# Patient Record
Sex: MALE | Race: BLACK OR AFRICAN AMERICAN | NOT HISPANIC OR LATINO | Employment: STUDENT | URBAN - METROPOLITAN AREA
[De-identification: names, ages, dates, MRNs, and addresses within clinical notes are randomized per-mention and may not be internally consistent; named-entity substitution may affect disease eponyms.]

---

## 2017-03-11 ENCOUNTER — HOSPITAL ENCOUNTER (EMERGENCY)
Facility: HOSPITAL | Age: 22
Discharge: PSYCHIATRIC OR SUBSTANCE USE FACILITY | End: 2017-03-11
Attending: EMERGENCY MEDICINE
Payer: COMMERCIAL

## 2017-03-11 VITALS
WEIGHT: 160 LBS | HEART RATE: 74 BPM | TEMPERATURE: 98 F | HEIGHT: 69 IN | RESPIRATION RATE: 18 BRPM | SYSTOLIC BLOOD PRESSURE: 139 MMHG | OXYGEN SATURATION: 100 % | BODY MASS INDEX: 23.7 KG/M2 | DIASTOLIC BLOOD PRESSURE: 89 MMHG

## 2017-03-11 DIAGNOSIS — F29 PSYCHOSIS, UNSPECIFIED PSYCHOSIS TYPE: Primary | ICD-10-CM

## 2017-03-11 LAB
ALBUMIN SERPL BCP-MCNC: 4.1 G/DL
ALP SERPL-CCNC: 54 U/L
ALT SERPL W/O P-5'-P-CCNC: 14 U/L
AMPHET+METHAMPHET UR QL: NEGATIVE
ANION GAP SERPL CALC-SCNC: 10 MMOL/L
APAP SERPL-MCNC: <3 UG/ML
AST SERPL-CCNC: 21 U/L
BARBITURATES UR QL SCN>200 NG/ML: NEGATIVE
BASOPHILS # BLD AUTO: 0.01 K/UL
BASOPHILS NFR BLD: 0.2 %
BENZODIAZ UR QL SCN>200 NG/ML: NEGATIVE
BILIRUB SERPL-MCNC: 0.7 MG/DL
BILIRUB UR QL STRIP: NEGATIVE
BUN SERPL-MCNC: 10 MG/DL
BZE UR QL SCN: NEGATIVE
CALCIUM SERPL-MCNC: 9.7 MG/DL
CANNABINOIDS UR QL SCN: NEGATIVE
CHLORIDE SERPL-SCNC: 104 MMOL/L
CLARITY UR REFRACT.AUTO: CLEAR
CO2 SERPL-SCNC: 26 MMOL/L
COLOR UR AUTO: NORMAL
CREAT SERPL-MCNC: 1 MG/DL
CREAT UR-MCNC: 66 MG/DL
DIFFERENTIAL METHOD: ABNORMAL
EOSINOPHIL # BLD AUTO: 0.1 K/UL
EOSINOPHIL NFR BLD: 1.9 %
ERYTHROCYTE [DISTWIDTH] IN BLOOD BY AUTOMATED COUNT: 12.4 %
EST. GFR  (AFRICAN AMERICAN): >60 ML/MIN/1.73 M^2
EST. GFR  (NON AFRICAN AMERICAN): >60 ML/MIN/1.73 M^2
ETHANOL SERPL-MCNC: <10 MG/DL
GLUCOSE SERPL-MCNC: 98 MG/DL
GLUCOSE UR QL STRIP: NEGATIVE
HCT VFR BLD AUTO: 45.1 %
HGB BLD-MCNC: 15.7 G/DL
HGB UR QL STRIP: NEGATIVE
KETONES UR QL STRIP: NEGATIVE
LEUKOCYTE ESTERASE UR QL STRIP: NEGATIVE
LYMPHOCYTES # BLD AUTO: 2.2 K/UL
LYMPHOCYTES NFR BLD: 45.6 %
MCH RBC QN AUTO: 31.3 PG
MCHC RBC AUTO-ENTMCNC: 34.8 %
MCV RBC AUTO: 90 FL
METHADONE UR QL SCN>300 NG/ML: NEGATIVE
MONOCYTES # BLD AUTO: 0.3 K/UL
MONOCYTES NFR BLD: 5.7 %
NEUTROPHILS # BLD AUTO: 2.2 K/UL
NEUTROPHILS NFR BLD: 46.6 %
NITRITE UR QL STRIP: NEGATIVE
OPIATES UR QL SCN: NEGATIVE
PCP UR QL SCN>25 NG/ML: NEGATIVE
PH UR STRIP: 8 [PH] (ref 5–8)
PLATELET # BLD AUTO: 197 K/UL
PMV BLD AUTO: 11.5 FL
POTASSIUM SERPL-SCNC: 4.5 MMOL/L
PROT SERPL-MCNC: 7.2 G/DL
PROT UR QL STRIP: NEGATIVE
RBC # BLD AUTO: 5.01 M/UL
SODIUM SERPL-SCNC: 140 MMOL/L
SP GR UR STRIP: 1.01 (ref 1–1.03)
TOXICOLOGY INFORMATION: NORMAL
TSH SERPL DL<=0.005 MIU/L-ACNC: 0.44 UIU/ML
URN SPEC COLLECT METH UR: NORMAL
UROBILINOGEN UR STRIP-ACNC: NEGATIVE EU/DL
WBC # BLD AUTO: 4.74 K/UL

## 2017-03-11 PROCEDURE — 99285 EMERGENCY DEPT VISIT HI MDM: CPT

## 2017-03-11 PROCEDURE — 84443 ASSAY THYROID STIM HORMONE: CPT

## 2017-03-11 PROCEDURE — 80053 COMPREHEN METABOLIC PANEL: CPT

## 2017-03-11 PROCEDURE — 81003 URINALYSIS AUTO W/O SCOPE: CPT

## 2017-03-11 PROCEDURE — 99285 EMERGENCY DEPT VISIT HI MDM: CPT | Mod: ,,, | Performed by: EMERGENCY MEDICINE

## 2017-03-11 PROCEDURE — 82570 ASSAY OF URINE CREATININE: CPT

## 2017-03-11 PROCEDURE — 25000003 PHARM REV CODE 250: Performed by: EMERGENCY MEDICINE

## 2017-03-11 PROCEDURE — 85025 COMPLETE CBC W/AUTO DIFF WBC: CPT

## 2017-03-11 PROCEDURE — 80320 DRUG SCREEN QUANTALCOHOLS: CPT

## 2017-03-11 PROCEDURE — 80329 ANALGESICS NON-OPIOID 1 OR 2: CPT

## 2017-03-11 RX ORDER — RISPERIDONE 1 MG/1
1 TABLET ORAL NIGHTLY
Status: DISCONTINUED | OUTPATIENT
Start: 2017-03-11 | End: 2017-03-11

## 2017-03-11 RX ORDER — RISPERIDONE 1 MG/1
1 TABLET ORAL
Status: COMPLETED | OUTPATIENT
Start: 2017-03-11 | End: 2017-03-11

## 2017-03-11 RX ADMIN — RISPERIDONE 1 MG: 1 TABLET ORAL at 06:03

## 2017-03-11 NOTE — ED TRIAGE NOTES
"Pt comes to ED with family present. States he has been hearing voices for a few weeks. States voices will say things like "There you go" or "Here he is". No suicidal or homicidal thoughts. Voices do not tell patient to do anything.  "

## 2017-03-11 NOTE — ED PROVIDER NOTES
Encounter Date: 3/11/2017    SCRIBE #1 NOTE: I, Arden Light, am scribing for, and in the presence of,  Dr. Waddell. I have scribed the following portions of the note - Other sections scribed: Attending ED Notes.       History     Chief Complaint   Patient presents with    Hallucinations     patient denies any SI or HI      Review of patient's allergies indicates:  No Known Allergies  HPI Comments: 23 y/o M presents with AH.  Onset several weeks ago, gradually worsening.  Voices are making random statements, but cannot discharge him to harm himself or others.  No headache, no fever.  No suicidal ideation, no visual hallucination.  No recent drug or etoh use.  Symptoms are acute and gradually worsening of the past several weeks.  No other physical complaints.  No sleep disturbance.  No withdrawal from social life.  No changes in appetite.    The history is provided by the patient.     History reviewed. No pertinent past medical history.  History reviewed. No pertinent surgical history.  Family History   Problem Relation Age of Onset    Family history unknown: Yes     Social History   Substance Use Topics    Smoking status: Former Smoker     Types: Cigarettes    Smokeless tobacco: None    Alcohol use Yes      Comment: very rarely      Review of Systems   Constitutional: Negative for fever.   HENT: Negative for sore throat.    Respiratory: Negative for shortness of breath.    Cardiovascular: Negative for chest pain.   Gastrointestinal: Negative for nausea.   Genitourinary: Negative for dysuria.   Musculoskeletal: Negative for back pain.   Skin: Negative for rash.   Neurological: Negative for weakness.   Hematological: Does not bruise/bleed easily.   Psychiatric/Behavioral: Positive for hallucinations. Negative for confusion and sleep disturbance. The patient is not nervous/anxious.        Physical Exam   Initial Vitals   BP Pulse Resp Temp SpO2   03/11/17 1155 03/11/17 1155 03/11/17 1155 03/11/17 1155 03/11/17  1155   155/96 65 18 97.4 °F (36.3 °C) 99 %     Physical Exam    Nursing note and vitals reviewed.  Constitutional: He appears well-developed and well-nourished. He is not diaphoretic. No distress.   HENT:   Head: Normocephalic and atraumatic.   Eyes: EOM are normal.   Neck: Normal range of motion. Neck supple. No rigidity.   Cardiovascular: Normal rate, regular rhythm, normal heart sounds and intact distal pulses.   Pulmonary/Chest: Breath sounds normal. No stridor. No respiratory distress. He has no wheezes. He has no rhonchi. He has no rales.   Abdominal: Soft. Bowel sounds are normal. He exhibits no distension. There is no tenderness. There is no rebound and no guarding.   Musculoskeletal: Normal range of motion. He exhibits no edema or tenderness.   Neurological: He is alert and oriented to person, place, and time.   Skin: Skin is warm and dry.   Psychiatric: He has a normal mood and affect. His behavior is normal. Judgment and thought content normal.         ED Course   Procedures  Labs Reviewed   CBC W/ AUTO DIFFERENTIAL - Abnormal; Notable for the following:        Result Value    MCH 31.3 (*)     All other components within normal limits   COMPREHENSIVE METABOLIC PANEL - Abnormal; Notable for the following:     Alkaline Phosphatase 54 (*)     All other components within normal limits   ACETAMINOPHEN LEVEL - Abnormal; Notable for the following:     Acetaminophen (Tylenol), Serum <3.0 (*)     All other components within normal limits   TSH   URINALYSIS   DRUG SCREEN PANEL, URINE EMERGENCY   ALCOHOL,MEDICAL (ETHANOL)             Medical Decision Making:   History:   Old Medical Records: I decided to obtain old medical records.  Initial Assessment:   23 y/o male presents with auditory hallucinations.  Differential includes limited to: Drug-induced mood disorder, acute psychosis, schizophrenia, bipolar, metabolic or electrolyte disturbance, thyroid dysfunction.  Will obtain screening labs and consult  psychiatry.  Clinical Tests:   Lab Tests: Ordered and Reviewed  Other:   I have discussed this case with another health care provider.            Scribe Attestation:   Scribe #1: I performed the above scribed service and the documentation accurately describes the services I performed. I attest to the accuracy of the note.    Attending Attestation:           Physician Attestation for Scribe:  Physician Attestation Statement for Scribe #1: I, Dr. Waddell, reviewed documentation, as scribed by Arden Light  in my presence, and it is both accurate and complete.         Attending ED Notes:   Pt is medically cleared.  Psych consulted and agrees that this likely represents acute psychosis from undiagnosed schizophrenia.  PEC.  Transfer to other psych facility for grave disability and inpt admission.          ED Course     Clinical Impression:   The encounter diagnosis was Psychosis, unspecified psychosis type.    Disposition:   Disposition: Transferred       Allen Waddell MD  03/11/17 [id number]

## 2017-03-11 NOTE — ED NOTES
Admit packet faxed to [facility], [facility], [facility], [facility], [facility], [facility], [facility], [facility], [facility] and [facility]. Waiting for response.

## 2017-03-11 NOTE — CONSULTS
"3/11/2017 2:58 PM   Juarez Julio   1995   [id number]        Psychiatric Consult     Chief complaint/Reason for consult: Auditory Hallucinations    SUBJECTIVE:   HPI:   Juarez Julio is a 22 y.o. male with past psychiatric history of substance induced psychosis, currently in the ED with a chief complain of auditory hallucinations.  Psychiatry has been consulted to address this problem.      Patient was admitted to [facility] 5/29/16 with discharge diagnoses of Synthetic substance use disorder  Cannabis use disorder, severe, dependence  Substance induced psychosis    Per ED MD Waddell 3/11/17:  23 y/o M presents with AH. Onset several weeks ago, gradually worsening. Voices are making random statements, but cannot discharge him to harm himself or others. No headache, no fever. No suicidal ideation, no visual hallucination. No recent drug or etoh use. Symptoms are acute and gradually worsening of the past several weeks. No other physical complaints. No sleep disturbance. No withdrawal from social life. No changes in appetite    On my interview: Mr. Julio is cheerful. He alternatingly describes his hallucinations as a problem and not a problem. He states that when he hears them, he tries to "laugh them off". He states that he is not really bothered by them, but then states that he feels that the voices could be from people in his dorm room are the ones that are talking to him. He states that they often comment on the things that he is doing,and if he moves in the middle of hte night, he will often hear a voice saying "look he's up". He describes the voices as sometimes female and sometimes male. He reports that his last substance use was February 16th, and he has been abstaining since that time, but the voices have gotten worse. He reports that he is in his last semester of college, studying kinesiology, and his grades have been about the same as before. He then divulges that he believes that the people he " thinks are talking to him have set up cameras and microphones in his room. When confronted about the fact that he has heard these voices while in the hospital, he is unable to explain how they would be talking to him or knowing what he's doing. He denies VH/SI/HI. Denies ideas of reference. No ideas of reference (tv/billboards/etc)    Pain: 0/10    Collateral:   MotherLeonidas is in the room at time of interview and confirms much of the HPI.    Medical Review Of Systems:  Constitutional: negative for chills, fatigue and fevers  Ears, nose, mouth, throat, and face: negative for hoarseness, sore mouth and sore throat  Respiratory: negative for cough, dyspnea on exertion, stridor and wheezing  Cardiovascular: negative for fatigue, palpitations and syncope  Gastrointestinal: negative for constipation, diarrhea, nausea and vomiting  Musculoskeletal:negative for back pain and muscle weakness  Neurological: negative for dizziness, gait problems and headaches    Current Medications:   Scheduled Meds:    PRN Meds:    Psychotherapeutics     None        OTC Meds: None   Home Meds: None    Allergies:   Review of patient's allergies indicates:  No Known Allergies     Past Medical/Surgical History:   History reviewed. No pertinent past medical history.  History reviewed. No pertinent surgical history.     Past Psychiatric History:   Previous Psychiatric Hospitalizations: [facility] 2016 for substance induced psychosis  Previous Medication Trials: Haldol andAtivan given last hospitalization  Previous Suicide Attempts: none   History of Violence: denies  Outpatient psychiatrist: none     Nerurological History:   Seizures: denies  Head trauma: denies     Social History:   Education: currently in final semester at [employer] Ed: no   Employment Status/Info: in school   Financial: reliesonfamily  Marital Status: single  Children: 0  Housing Status: Lives at a dorm   history: none  History of phys/sexual  "abuse: denies   Access to gun: denies    Substance Abuse History:   Recreational Drugs: previously marijuana and synthetic cannabinoids, most recent marijuana use2/16/17, most recent synthetic use 2016   Use of Alcohol: occasional  Tobacco Use: "every once in a while"  Rehab History: denies    Legal History:   Past Charges/Incarcerations: denies  Pending charges: denies    Family Psychiatric History:   denies    OBJECTIVE:   Vitals   Vitals:    03/11/17 1155   BP: (!) 155/96   Pulse: 65   Resp: 18   Temp: 97.4 °F (36.3 °C)        Labs/Imaging/Studies:   Recent Results (from the past 48 hour(s))   CBC auto differential    Collection Time: 03/11/17 12:33 PM   Result Value Ref Range    WBC 4.74 3.90 - 12.70 K/uL    RBC 5.01 4.60 - 6.20 M/uL    Hemoglobin 15.7 14.0 - 18.0 g/dL    Hematocrit 45.1 40.0 - 54.0 %    MCV 90 82 - 98 fL    MCH 31.3 (H) 27.0 - 31.0 pg    MCHC 34.8 32.0 - 36.0 %    RDW 12.4 11.5 - 14.5 %    Platelets 197 150 - 350 K/uL    MPV 11.5 9.2 - 12.9 fL    Gran # 2.2 1.8 - 7.7 K/uL    Lymph # 2.2 1.0 - 4.8 K/uL    Mono # 0.3 0.3 - 1.0 K/uL    Eos # 0.1 0.0 - 0.5 K/uL    Baso # 0.01 0.00 - 0.20 K/uL    Gran% 46.6 38.0 - 73.0 %    Lymph% 45.6 18.0 - 48.0 %    Mono% 5.7 4.0 - 15.0 %    Eosinophil% 1.9 0.0 - 8.0 %    Basophil% 0.2 0.0 - 1.9 %    Differential Method Automated    Comprehensive metabolic panel    Collection Time: 03/11/17 12:33 PM   Result Value Ref Range    Sodium 140 136 - 145 mmol/L    Potassium 4.5 3.5 - 5.1 mmol/L    Chloride 104 95 - 110 mmol/L    CO2 26 23 - 29 mmol/L    Glucose 98 70 - 110 mg/dL    BUN, Bld 10 6 - 20 mg/dL    Creatinine 1.0 0.5 - 1.4 mg/dL    Calcium 9.7 8.7 - 10.5 mg/dL    Total Protein 7.2 6.0 - 8.4 g/dL    Albumin 4.1 3.5 - 5.2 g/dL    Total Bilirubin 0.7 0.1 - 1.0 mg/dL    Alkaline Phosphatase 54 (L) 55 - 135 U/L    AST 21 10 - 40 U/L    ALT 14 10 - 44 U/L    Anion Gap 10 8 - 16 mmol/L    eGFR if African American >60.0 >60 mL/min/1.73 m^2    eGFR if non African " American >60.0 >60 mL/min/1.73 m^2   TSH    Collection Time: 03/11/17 12:33 PM   Result Value Ref Range    TSH 0.442 0.400 - 4.000 uIU/mL   Ethanol    Collection Time: 03/11/17 12:33 PM   Result Value Ref Range    Alcohol, Medical, Serum <10 <10 mg/dL   Acetaminophen level    Collection Time: 03/11/17 12:33 PM   Result Value Ref Range    Acetaminophen (Tylenol), Serum <3.0 (L) 10.0 - 20.0 ug/mL   Urinalysis - clean catch    Collection Time: 03/11/17  1:07 PM   Result Value Ref Range    Specimen UA Urine, Clean Catch     Color, UA Straw Yellow, Straw, Linsey    Appearance, UA Clear Clear    pH, UA 8.0 5.0 - 8.0    Specific Gravity, UA 1.010 1.005 - 1.030    Protein, UA Negative Negative    Glucose, UA Negative Negative    Ketones, UA Negative Negative    Bilirubin (UA) Negative Negative    Occult Blood UA Negative Negative    Nitrite, UA Negative Negative    Urobilinogen, UA Negative <2.0 EU/dL    Leukocytes, UA Negative Negative   Drug screen panel, emergency    Collection Time: 03/11/17  1:07 PM   Result Value Ref Range    Benzodiazepines Negative     Methadone metabolites Negative     Cocaine (Metab.) Negative     Opiate Scrn, Ur Negative     Barbiturate Screen, Ur Negative     Amphetamine Screen, Ur Negative     THC Negative     Phencyclidine Negative     Creatinine, Random Ur 66.0 23.0 - 375.0 mg/dL    Toxicology Information SEE COMMENT       No results found for: PHENYTOIN, PHENOBARB, VALPROATE, CBMZ      Physical Exam:   General: well developed, well nourished  Head: normocephalic, atraumatic  Throat: lips, mucosa, and tongue normal; teeth and gums normal and no throat erythema  Neck: supple, symmetrical, trachea midline, no JVD and thyroid not enlarged, symmetric, no tenderness/mass/nodules  Lungs:  clear to auscultation bilaterally and normal respiratory effort  Heart: regular rate and rhythm, S1, S2 normal, no murmur, click, rub or gallop  Abdomen: soft, non-tender non-distented; bowel sounds normal; no  masses,  no organomegaly  Pulses: 2+ and symmetric  Skin: Skin color, texture, turgor normal. No rashes or lesions  Neurologic: Normal strength and tone. No focal numbness or weakness    Mental Status Exam:  Appearance: unremarkable, age appropriate  Grooming: appropriate to situation  Arousal: alert, awake  Behavior/Cooperation: normal, cooperative  Speech: normal tone, normal rate, normal pitch, normal volume  Language: appropriate english vocabulary  Mood: fine  Affect: euthymic  Thought Process: normal and logical  Thought Content: normal, no suicidality, no homicidality, delusions, or paranoia  Associations: no loose associations noted  Orientation: grossly intact  Memory: Remote intact and Recent intact  Fund of Knowledge: appropriate for education level  Attention Span/Concentration: Normal  Cognition: grossly intact  Insight: poor with regards to his current problem. Appears to have fair insight otherwise  Judgment: poor, patient wishes to return home and refuse inpatient treatment      ASSESSMENT/PLAN:   Unspecified schizophrenia spectrum disorder (first break) vs Substance induced psychotic disorder    Recommendations:   1. Dispo/Legal Status: Cont PEC at this time as the pt is currently gravely disabled as evidenced by paranoia toward dorm-mates. Seek inpt bed for pt safety and stabilization when/if medically cleared by the ER MD. Continue to observe pt's behavior while in the ER and will reassess the pt daily until placement is found.    2. Scheduled Medications: Risperdal 1mg QHS Defer any non-psych meds to the ER MD.    3. PRN Medications: Haldol/Ativan prn non-redirectable agitation associated with breakthrough psychosis or analia if needed to help the pt more effectively interact with his environment.     4. Precautions/Nursing: elopement      5. To-Do: seek Placement      6. Case Discussed with: Dr. Pepper Ladd M.D.  3/11/2017 3:58 PM  [facility]  [place]  [phone]

## 2024-05-20 ENCOUNTER — OFFICE VISIT (OUTPATIENT)
Dept: DERMATOLOGY | Facility: CLINIC | Age: 29
End: 2024-05-20
Payer: COMMERCIAL

## 2024-05-20 DIAGNOSIS — D48.5 NEOPLASM OF UNCERTAIN BEHAVIOR OF SKIN: Primary | ICD-10-CM

## 2024-05-20 PROCEDURE — 99499 UNLISTED E&M SERVICE: CPT | Mod: S$GLB,,, | Performed by: DERMATOLOGY

## 2024-05-20 PROCEDURE — 99999 PR PBB SHADOW E&M-NEW PATIENT-LVL III: CPT | Mod: PBBFAC,,, | Performed by: DERMATOLOGY

## 2024-05-20 PROCEDURE — 11103 TANGNTL BX SKIN EA SEP/ADDL: CPT | Mod: S$GLB,,, | Performed by: DERMATOLOGY

## 2024-05-20 PROCEDURE — 88305 TISSUE EXAM BY PATHOLOGIST: CPT | Mod: 26,,, | Performed by: PATHOLOGY

## 2024-05-20 PROCEDURE — 88305 TISSUE EXAM BY PATHOLOGIST: CPT | Mod: 59 | Performed by: PATHOLOGY

## 2024-05-20 PROCEDURE — 11102 TANGNTL BX SKIN SINGLE LES: CPT | Mod: S$GLB,,, | Performed by: DERMATOLOGY

## 2024-05-20 NOTE — PATIENT INSTRUCTIONS
Shave Biopsy Wound Care    Your doctor has performed a shave biopsy today.  A band aid and vaseline ointment has been placed over the site.  This should remain in place for 24 hours.  It is recommended that you keep the area dry for the first 24 hours.  After 24 hours, you may remove the band aid and wash the area with warm soap and water and apply Vaseline jelly.  Many patients prefer to use Neosporin or Bacitracin ointment.  This is acceptable; however, know that you can develop an allergy to this medication even if you have used it safely for years.  It is important to keep the area moist.  Letting it dry out and get air slows healing time, and will worsen the scar.  Band aid is optional after first 24 hours.      If you notice increasing redness, tenderness, pain, or yellow drainage at the biopsy site, please notify your doctor.  These are signs of an infection.    If your biopsy site is bleeding, apply firm pressure for 15 minutes straight.  Repeat for another 15 minutes, if it is still bleeding.   If the surgical site continues to bleed, then please contact your doctor.      [facility] - [facility]   DERMATOLOGY  [address]   [place] [address]   Dept: [phone]   Dept Fax: [phone]

## 2024-05-20 NOTE — PROGRESS NOTES
Subjective:      Patient ID:  Juarez Julio is a 29 y.o. male who presents for No chief complaint on file.    History of Present Illness: The patient presents with chief complaint of keloid located on the back. Concerned about keloid  Location: back   Duration: several years   Signs/Symptoms: none    Prior treatments: none       Review of Systems   Constitutional:  Negative for fever and chills.   Gastrointestinal:  Negative for nausea and vomiting.   Skin:  Positive for activity-related sunscreen use. Negative for daily sunscreen use and recent sunburn.   Hematologic/Lymphatic: Does not bruise/bleed easily.       Objective:   Physical Exam   Constitutional: He appears well-developed and well-nourished. No distress.   Neurological: He is alert and oriented to person, place, and time. He is not disoriented.   Psychiatric: He has a normal mood and affect.   Skin:   Areas Examined (abnormalities noted in diagram):   Head / Face Inspection Performed  Neck Inspection Performed  Chest / Axilla Inspection Performed  Abdomen Inspection Performed  Back Inspection Performed  RUE Inspected  LUE Inspection Performed  Nails and Digits Inspection Performed                        Assessment / Plan:      Pathology Orders:       Normal Orders This Visit    Specimen to Pathology, Dermatology     Comments:    Number of Specimens:->2  ------------------------->-------------------------  Spec 1 Procedure:->Biopsy  Spec 1 Clinical Impression:->neurofibroma  Spec 1 Source:->right back  ------------------------->-------------------------  Spec 2 Procedure:->Biopsy  Spec 2 Clinical Impression:->neurofibroma  Spec 2 Source:->right chest  Release to patient->Immediate    Questions:    Procedure Type: Dermatology and skin neoplasms    Number of Specimens: 2    ------------------------: -------------------------    Spec 1 Procedure: Biopsy    Spec 1 Clinical Impression: neurofibroma    Spec 1 Source: right back    ------------------------:  -------------------------    Spec 2 Procedure: Biopsy    Spec 2 Clinical Impression: neurofibroma    Spec 2 Source: right chest    Clinical Information: see above    Release to patient: Immediate          Neoplasm of uncertain behavior of skin  -     Specimen to Pathology, Dermatology  -      if multiple neurofibromas, will refer to genetics. Shave biopsy(-ies) done of 2 site(s).   Patient informed to call for results within 2 weeks if have not received notification [facility] telephone [facility] or [facility]             Follow up for call for results.    PROCEDURE NOTE - SHAVE BIOPSY   Location: see above    After risk, benefits, and alternatives were discussed with the patient, the patient agrees to the procedure by verbal informed consent.  The area(s) were cleansed with alcohol. 2 cc of lidocaine 1% with epinephrine was injected for local anesthesia into each lesion(s).  A sharp dermablade was used to remove part or all of the lesion(s).  The specimen(s) will be sent for tissue pathology.  Hemostasis was obtained with aluminum chloride and/or hyfrecation.  The area(s) were dressed with vaseline ointment and bandaged.  The patient tolerated the procedure well without adverse events.  Wound care instructions were given to the patient on the AVS.  The patient will be notified of pathology results once available. Results will also be available in Epic.

## 2024-05-23 DIAGNOSIS — Q85.00 NEUROFIBROMA OF MULTIPLE SITES: Primary | ICD-10-CM

## 2024-05-23 LAB
FINAL PATHOLOGIC DIAGNOSIS: NORMAL
GROSS: NORMAL
Lab: NORMAL
MICROSCOPIC EXAM: NORMAL

## 2024-05-29 ENCOUNTER — TELEPHONE (OUTPATIENT)
Dept: DERMATOLOGY | Facility: CLINIC | Age: 29
End: 2024-05-29
Payer: COMMERCIAL

## 2024-05-29 NOTE — TELEPHONE ENCOUNTER
Called pt regarding results. Pt was notified of path finding and referral. Pt stated no questions and verbalized understanding.     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  The next step is to refer him to Genetics so that he can be worked up for possible neurofibromatosis which is a condition that can affect the skin, the muscles, the eyes, and also can cause neurological effects.  This does not mean that he has neurofibromatosis, but he will need genetic/blood testing to diagnose or rule out.  I will place a referral for genetics.  If he does not hear from them within the next week, he should reach out to us and let us know.

## 2024-09-30 ENCOUNTER — OFFICE VISIT (OUTPATIENT)
Dept: DERMATOLOGY | Facility: CLINIC | Age: 29
End: 2024-09-30
Payer: COMMERCIAL

## 2024-09-30 DIAGNOSIS — D48.5 NEOPLASM OF UNCERTAIN BEHAVIOR OF SKIN: ICD-10-CM

## 2024-09-30 DIAGNOSIS — Q85.00 NEUROFIBROMA OF MULTIPLE SITES: Primary | ICD-10-CM

## 2024-09-30 PROCEDURE — 99999 PR PBB SHADOW E&M-EST. PATIENT-LVL II: CPT | Mod: PBBFAC,,, | Performed by: DERMATOLOGY

## 2024-09-30 PROCEDURE — 99499 UNLISTED E&M SERVICE: CPT | Mod: S$GLB,,, | Performed by: DERMATOLOGY

## 2024-09-30 PROCEDURE — 88305 TISSUE EXAM BY PATHOLOGIST: CPT | Performed by: DERMATOLOGY

## 2024-09-30 PROCEDURE — 11102 TANGNTL BX SKIN SINGLE LES: CPT | Mod: S$GLB,,, | Performed by: DERMATOLOGY

## 2024-09-30 PROCEDURE — 88305 TISSUE EXAM BY PATHOLOGIST: CPT | Mod: 26,,, | Performed by: DERMATOLOGY

## 2024-09-30 NOTE — LETTER
September 30, 2024      [facility] Dermatology  [address] [place] [address]  Phone: [phone]  Fax: [phone]       Patient: Juarez Julio   YOB: 1995  Date of Visit: 09/30/2024    To Whom It May Concern:    Nhung Julio  was at [facility] on 09/30/2024. The patient may return to work on 10/01/2024 with no restrictions. If you have any questions or concerns, or if I can be of further assistance, please do not hesitate to contact me.    Sincerely,    Koki Craig RN

## 2024-09-30 NOTE — PATIENT INSTRUCTIONS
Shave Biopsy Wound Care    Your doctor has performed a shave biopsy today.  A band aid and vaseline ointment has been placed over the site.  This should remain in place for 24 hours.  It is recommended that you keep the area dry for the first 24 hours.  After 24 hours, you may remove the band aid and wash the area with warm soap and water and apply Vaseline jelly.  Many patients prefer to use Neosporin or Bacitracin ointment.  This is acceptable; however, know that you can develop an allergy to this medication even if you have used it safely for years.  It is important to keep the area moist.  Letting it dry out and get air slows healing time, and will worsen the scar.  Band aid is optional after first 24 hours.      If you notice increasing redness, tenderness, pain, or yellow drainage at the biopsy site, please notify your doctor.  These are signs of an infection.    If your biopsy site is bleeding, apply firm pressure for 15 minutes straight.  Repeat for another 15 minutes, if it is still bleeding.   If the surgical site continues to bleed, then please contact your doctor.      [facility] - [facility]   DERMATOLOGY  [address]   [place] [address]   Dept: [phone]   Dept Fax: [phone]

## 2024-09-30 NOTE — PROGRESS NOTES
Subjective:      Patient ID:  Juarez Julio is a 29 y.o. male who presents for   Chief Complaint   Patient presents with    Follow-up     Reports the areas are improving.      + growth of lesion of the left upper back, midline lower back (x 2) and abdomen, + discomfort. Here today for shave biopsy.      Review of Systems   Constitutional:  Negative for fever and chills.   Gastrointestinal:  Negative for nausea and vomiting.   Skin:  Positive for activity-related sunscreen use. Negative for daily sunscreen use and recent sunburn.   Hematologic/Lymphatic: Does not bruise/bleed easily.       Objective:   Physical Exam   Constitutional: He appears well-developed and well-nourished. No distress.   Neurological: He is alert and oriented to person, place, and time. He is not disoriented.   Psychiatric: He has a normal mood and affect.   Skin:   Areas Examined (abnormalities noted in diagram):   Head / Face Inspection Performed  Neck Inspection Performed  Chest / Axilla Inspection Performed  Back Inspection Performed  RUE Inspected  LUE Inspection Performed                Assessment / Plan:      Pathology Orders:       Normal Orders This Visit    Specimen to Pathology, Dermatology     Comments:    Number of Specimens:->1  ------------------------->-------------------------  Spec 1 Procedure:->Biopsy  Spec 1 Clinical Impression:->NUB  Spec 1 Source:->left upper back  Release to patient->Immediate    Questions:    Procedure Type: Dermatology and skin neoplasms    Number of Specimens: 1    ------------------------: -------------------------    Spec 1 Procedure: Biopsy    Spec 1 Clinical Impression: NUB    Spec 1 Source: left upper back    Clinical Information: see above    Release to patient: Immediate          Neurofibroma  Shave removal x 3 of the lower back and right chest.    Neoplasm of uncertain behavior of skin  -     Specimen to Pathology, Dermatology  -     Shave biopsy(-ies) done of 1 site(s) left upper back  (largest lesion).   Patient informed to call for results within 2 weeks if have not received notification [facility]           No follow-ups on file.    PROCEDURE NOTE - SHAVE BIOPSY   Location: see above    After risk, benefits, and alternatives were discussed with the patient, the patient agrees to the procedure by verbal informed consent.  The area(s) were cleansed with alcohol. 2 cc of lidocaine 1% with epinephrine was injected for local anesthesia into each lesion(s).  A sharp dermablade was used to remove part or all of the lesion(s).  The specimen(s) will be sent for tissue pathology.  Hemostasis was obtained with aluminum chloride and/or hyfrecation.  The area(s) were dressed with vaseline ointment and bandaged.  The patient tolerated the procedure well without adverse events.  Wound care instructions were given to the patient on the AVS.  The patient will be notified of pathology results once available. Results will also be available in Epic.

## 2024-10-03 LAB
FINAL PATHOLOGIC DIAGNOSIS: NORMAL
GROSS: NORMAL
Lab: NORMAL
MICROSCOPIC EXAM: NORMAL

## 2025-01-04 ENCOUNTER — HOSPITAL ENCOUNTER (EMERGENCY)
Facility: HOSPITAL | Age: 30
Discharge: HOME | End: 2025-01-04
Attending: INTERNAL MEDICINE
Payer: COMMERCIAL

## 2025-01-04 VITALS
TEMPERATURE: 98 F | OXYGEN SATURATION: 97 % | HEART RATE: 64 BPM | WEIGHT: 170 LBS | SYSTOLIC BLOOD PRESSURE: 142 MMHG | HEIGHT: 69 IN | DIASTOLIC BLOOD PRESSURE: 85 MMHG | RESPIRATION RATE: 16 BRPM | BODY MASS INDEX: 25.18 KG/M2

## 2025-01-04 DIAGNOSIS — S05.01XA ABRASION OF RIGHT CORNEA, INITIAL ENCOUNTER: Primary | ICD-10-CM

## 2025-01-04 PROCEDURE — 25000003 PHARM REV CODE 250: Mod: ER | Performed by: NURSE PRACTITIONER

## 2025-01-04 PROCEDURE — 99283 EMERGENCY DEPT VISIT LOW MDM: CPT | Mod: ER

## 2025-01-04 RX ORDER — PROPARACAINE HYDROCHLORIDE 5 MG/ML
1 SOLUTION/ DROPS OPHTHALMIC
Status: COMPLETED | OUTPATIENT
Start: 2025-01-04 | End: 2025-01-04

## 2025-01-04 RX ORDER — ERYTHROMYCIN 5 MG/G
OINTMENT OPHTHALMIC EVERY 8 HOURS
Qty: 1 G | Refills: 0 | Status: SHIPPED | OUTPATIENT
Start: 2025-01-04 | End: 2025-01-09

## 2025-01-04 RX ADMIN — FLUORESCEIN SODIUM 1 EACH: 1 STRIP OPHTHALMIC at 09:01

## 2025-01-04 RX ADMIN — PROPARACAINE HYDROCHLORIDE 1 DROP: 5 SOLUTION/ DROPS OPHTHALMIC at 09:01

## 2025-01-05 NOTE — ED PROVIDER NOTES
Encounter Date: 1/4/2025       History     Chief Complaint   Patient presents with    Eye Injury     PT SCRATCH HIS RIGHT EYE X WEEK AGO, GETTING WORSEN, REDNESS, DISCOMFORT AT THE MOMENT.     29-year-old male presents to the emergency department complaining of scratching his right eye approximately 1 week ago, and worsening right eye pain, with increased tearing and without vision changes.  He denies any other trauma.    The history is provided by the patient. No  was used.     Review of patient's allergies indicates:  No Known Allergies  History reviewed. No pertinent past medical history.  History reviewed. No pertinent surgical history.  Family History   Family history unknown: Yes     Social History     Tobacco Use    Smoking status: Some Days     Types: Cigarettes, Cigars   Substance Use Topics    Alcohol use: Yes     Comment: very rarely     Drug use: Yes     Types: Marijuana     Comment: synthetic mariuana     Review of Systems   Constitutional:  Negative for chills and fever.   Eyes:  Positive for photophobia, pain and redness. Negative for discharge and visual disturbance.   Respiratory:  Negative for shortness of breath.    Cardiovascular:  Negative for chest pain.   Gastrointestinal:  Negative for abdominal pain.   All other systems reviewed and are negative.      Physical Exam     Initial Vitals [01/04/25 2022]   BP Pulse Resp Temp SpO2   (!) 142/85 64 16 97.8 °F (36.6 °C) 97 %      MAP       --         Physical Exam    Nursing note and vitals reviewed.  Constitutional: He is not diaphoretic. No distress.   HENT:   Head: Normocephalic and atraumatic.   Right Ear: External ear normal.   Left Ear: External ear normal. Mouth/Throat: Oropharynx is clear and moist.   Eyes:   Right conjunctival injection with uptake of fluorescein dye to the medial conjunctival region at 3:00   Neck: Neck supple.   Normal range of motion.  Cardiovascular:  Normal rate and regular rhythm.            Pulmonary/Chest: Breath sounds normal.   Abdominal: Abdomen is soft. Bowel sounds are normal.   Musculoskeletal:         General: Normal range of motion.      Cervical back: Normal range of motion and neck supple.     Neurological: He is alert. He has normal strength.   Skin: Skin is warm and dry. Capillary refill takes less than 2 seconds.   Psychiatric: He has a normal mood and affect.         ED Course   Procedures  Labs Reviewed - No data to display       Imaging Results    None          Medications   fluorescein ophthalmic strip 1 each (1 each Both Eyes Given 1/4/25 2121)   proparacaine 0.5 % ophthalmic solution 1 drop (1 drop Both Eyes Given by Provider 1/4/25 2120)     Medical Decision Making  29-year-old male presents to the emergency department complaining of scratching his right eye approximately 1 week ago, and worsening right eye pain, with increased tearing and without vision changes.  He denies any other trauma.  Course of ED stay:   Patient was given instructions for corneal abrasion and received a prescription for erythromycin ointment.  Referral to Ophthalmology Clinic was also given and the patient was advised to follow-up with ophthalmology clinic within the next week/return to the emergency department if condition worsens.    Risk  Prescription drug management.                                      Clinical Impression:  Final diagnoses:  [S05.01XA] Abrasion of right cornea, initial encounter (Primary)          ED Disposition Condition    Discharge Stable          ED Prescriptions       Medication Sig Dispense Start Date End Date Auth. Provider    erythromycin (ROMYCIN) ophthalmic ointment Place into the right eye every 8 (eight) hours. Place a 1/2 inch ribbon of ointment into the lower eyelid. for 5 days 1 g 1/4/2025 1/9/2025 Jose Ramon Quijano MD          Follow-up Information       Follow up With Specialties Details Why Contact Info    Alexy Wong MD Pediatrics Schedule an appointment as  soon as possible for a visit in 1 week For reevaluation [address] [place] [address]  [id number]  [place] [address]  [phone]               Jose Ramon Quijano MD  01/05/25 0585

## 2025-06-27 DIAGNOSIS — L91.0 KELOID: Primary | ICD-10-CM

## 2025-06-27 RX ORDER — FLUOCINONIDE 0.5 MG/G
CREAM TOPICAL
Qty: 60 G | Refills: 2 | Status: SHIPPED | OUTPATIENT
Start: 2025-06-27

## 2025-06-30 ENCOUNTER — TELEPHONE (OUTPATIENT)
Dept: DERMATOLOGY | Facility: CLINIC | Age: 30
End: 2025-06-30
Payer: COMMERCIAL

## 2025-06-30 NOTE — TELEPHONE ENCOUNTER
Called and spoke to patient. Pt notified that prescription was sent to the [facility] in [place]. Pt verbalized understanding   ----- Message from Dipika Terry MD sent at 6/27/2025  4:20 PM CDT -----  His pharmacies only listed [facility] in [place], [place].  There was a [facility] in [place], [place], I sent it there.  ----- Message -----  From: Koki Craig RN  Sent: 6/26/2025  12:29 PM CDT  To: Dipika Terry MD    Talked to patient on the phone and he is agreeable to having the cream for keloids sent in to the [facility] on file.

## 2025-06-30 NOTE — TELEPHONE ENCOUNTER
----- Message from Dipika Terry MD sent at 6/27/2025  4:17 PM CDT -----  Will do, thanks!  ----- Message -----  From: Koki Craig RN  Sent: 6/26/2025  12:29 PM CDT  To: Dipika Terry MD    Talked to patient on the phone and he is agreeable to having the cream for keloids sent in to the [facility] on file.